# Patient Record
Sex: FEMALE | Race: OTHER | NOT HISPANIC OR LATINO | URBAN - METROPOLITAN AREA
[De-identification: names, ages, dates, MRNs, and addresses within clinical notes are randomized per-mention and may not be internally consistent; named-entity substitution may affect disease eponyms.]

---

## 2019-02-23 ENCOUNTER — EMERGENCY (EMERGENCY)
Facility: HOSPITAL | Age: 24
LOS: 1 days | Discharge: HOME | End: 2019-02-23
Admitting: EMERGENCY MEDICINE
Payer: COMMERCIAL

## 2019-02-23 VITALS
SYSTOLIC BLOOD PRESSURE: 122 MMHG | RESPIRATION RATE: 18 BRPM | OXYGEN SATURATION: 100 % | HEART RATE: 77 BPM | DIASTOLIC BLOOD PRESSURE: 58 MMHG | TEMPERATURE: 98 F

## 2019-02-23 VITALS
DIASTOLIC BLOOD PRESSURE: 80 MMHG | OXYGEN SATURATION: 99 % | RESPIRATION RATE: 18 BRPM | SYSTOLIC BLOOD PRESSURE: 126 MMHG | HEART RATE: 81 BPM | TEMPERATURE: 98 F

## 2019-02-23 LAB
ALBUMIN SERPL ELPH-MCNC: 3.8 G/DL — SIGNIFICANT CHANGE UP (ref 3.4–5)
ALP SERPL-CCNC: 50 U/L — SIGNIFICANT CHANGE UP (ref 40–120)
ALT FLD-CCNC: 23 U/L — SIGNIFICANT CHANGE UP (ref 12–42)
ANION GAP SERPL CALC-SCNC: 9 MMOL/L — SIGNIFICANT CHANGE UP (ref 9–16)
APPEARANCE UR: CLEAR — SIGNIFICANT CHANGE UP
AST SERPL-CCNC: 25 U/L — SIGNIFICANT CHANGE UP (ref 15–37)
BASOPHILS NFR BLD AUTO: 0.5 % — SIGNIFICANT CHANGE UP (ref 0–2)
BILIRUB SERPL-MCNC: 0.4 MG/DL — SIGNIFICANT CHANGE UP (ref 0.2–1.2)
BILIRUB UR-MCNC: NEGATIVE — SIGNIFICANT CHANGE UP
BUN SERPL-MCNC: 19 MG/DL — SIGNIFICANT CHANGE UP (ref 7–23)
CALCIUM SERPL-MCNC: 8.6 MG/DL — SIGNIFICANT CHANGE UP (ref 8.5–10.5)
CHLORIDE SERPL-SCNC: 106 MMOL/L — SIGNIFICANT CHANGE UP (ref 96–108)
CO2 SERPL-SCNC: 25 MMOL/L — SIGNIFICANT CHANGE UP (ref 22–31)
COLOR SPEC: YELLOW — SIGNIFICANT CHANGE UP
CREAT SERPL-MCNC: 0.74 MG/DL — SIGNIFICANT CHANGE UP (ref 0.5–1.3)
DIFF PNL FLD: ABNORMAL
EOSINOPHIL NFR BLD AUTO: 0 % — SIGNIFICANT CHANGE UP (ref 0–6)
GLUCOSE SERPL-MCNC: 88 MG/DL — SIGNIFICANT CHANGE UP (ref 70–99)
GLUCOSE UR QL: NEGATIVE — SIGNIFICANT CHANGE UP
HCG UR QL: NEGATIVE — SIGNIFICANT CHANGE UP
HCT VFR BLD CALC: 36.9 % — SIGNIFICANT CHANGE UP (ref 34.5–45)
HGB BLD-MCNC: 12.3 G/DL — SIGNIFICANT CHANGE UP (ref 11.5–15.5)
IMM GRANULOCYTES NFR BLD AUTO: 0.2 % — SIGNIFICANT CHANGE UP (ref 0–1.5)
KETONES UR-MCNC: 15 MG/DL
LEUKOCYTE ESTERASE UR-ACNC: NEGATIVE — SIGNIFICANT CHANGE UP
LYMPHOCYTES # BLD AUTO: 10.4 % — LOW (ref 13–44)
MAGNESIUM SERPL-MCNC: 1.7 MG/DL — SIGNIFICANT CHANGE UP (ref 1.6–2.6)
MCHC RBC-ENTMCNC: 27.9 PG — SIGNIFICANT CHANGE UP (ref 27–34)
MCHC RBC-ENTMCNC: 33.3 G/DL — SIGNIFICANT CHANGE UP (ref 32–36)
MCV RBC AUTO: 83.7 FL — SIGNIFICANT CHANGE UP (ref 80–100)
MONOCYTES NFR BLD AUTO: 3.7 % — SIGNIFICANT CHANGE UP (ref 2–14)
NEUTROPHILS NFR BLD AUTO: 85.2 % — HIGH (ref 43–77)
NITRITE UR-MCNC: NEGATIVE — SIGNIFICANT CHANGE UP
PH UR: 7.5 — SIGNIFICANT CHANGE UP (ref 5–8)
PLATELET # BLD AUTO: 320 K/UL — SIGNIFICANT CHANGE UP (ref 150–400)
POTASSIUM SERPL-MCNC: 4.3 MMOL/L — SIGNIFICANT CHANGE UP (ref 3.5–5.3)
POTASSIUM SERPL-SCNC: 4.3 MMOL/L — SIGNIFICANT CHANGE UP (ref 3.5–5.3)
PROT SERPL-MCNC: 7.7 G/DL — SIGNIFICANT CHANGE UP (ref 6.4–8.2)
PROT UR-MCNC: ABNORMAL MG/DL
RBC # BLD: 4.41 M/UL — SIGNIFICANT CHANGE UP (ref 3.8–5.2)
RBC # FLD: 15.2 % — HIGH (ref 10.3–14.5)
SODIUM SERPL-SCNC: 140 MMOL/L — SIGNIFICANT CHANGE UP (ref 132–145)
SP GR SPEC: 1.01 — SIGNIFICANT CHANGE UP (ref 1–1.03)
UROBILINOGEN FLD QL: 0.2 E.U./DL — SIGNIFICANT CHANGE UP
WBC # BLD: 9.9 K/UL — SIGNIFICANT CHANGE UP (ref 3.8–10.5)
WBC # FLD AUTO: 9.9 K/UL — SIGNIFICANT CHANGE UP (ref 3.8–10.5)

## 2019-02-23 PROCEDURE — 99284 EMERGENCY DEPT VISIT MOD MDM: CPT

## 2019-02-23 RX ORDER — ONDANSETRON 8 MG/1
4 TABLET, FILM COATED ORAL ONCE
Qty: 0 | Refills: 0 | Status: COMPLETED | OUTPATIENT
Start: 2019-02-23 | End: 2019-02-23

## 2019-02-23 RX ORDER — SODIUM CHLORIDE 9 MG/ML
1000 INJECTION INTRAMUSCULAR; INTRAVENOUS; SUBCUTANEOUS ONCE
Qty: 0 | Refills: 0 | Status: COMPLETED | OUTPATIENT
Start: 2019-02-23 | End: 2019-02-23

## 2019-02-23 RX ADMIN — SODIUM CHLORIDE 2000 MILLILITER(S): 9 INJECTION INTRAMUSCULAR; INTRAVENOUS; SUBCUTANEOUS at 16:55

## 2019-02-23 RX ADMIN — ONDANSETRON 4 MILLIGRAM(S): 8 TABLET, FILM COATED ORAL at 17:31

## 2019-02-23 NOTE — ED ADULT NURSE NOTE - NSIMPLEMENTINTERV_GEN_ALL_ED
Implemented All Universal Safety Interventions:  [place] to call system. Call bell, personal items and telephone within reach. Instruct patient to call for assistance. Room bathroom lighting operational. Non-slip footwear when patient is off stretcher. Physically safe environment: no spills, clutter or unnecessary equipment. Stretcher in lowest position, wheels locked, appropriate side rails in place.

## 2019-02-23 NOTE — ED PROVIDER NOTE - CHPI ED SYMPTOMS NEG
no abdominal distension/no diarrhea/no chills/no blood in stool/no burning urination/no dysuria/no fever/no hematuria

## 2019-02-23 NOTE — ED PROVIDER NOTE - CLINICAL SUMMARY MEDICAL DECISION MAKING FREE TEXT BOX
n/v related to heavy alcohol use; pt not daily drinker and CIWA 0; well appearing.  ddx includes dehydration and n/v related to alcohol consumption

## 2019-02-23 NOTE — ED PROVIDER NOTE - OBJECTIVE STATEMENT
Pt is 22 yo female with no sig pmhx who presents with nausea and vomiting for several hours after heavy alcohol intake last night.  Pt reports also has a headache which she notes is consistent with headaches shes had in the past after drinking.  Pt denies any abdominal pain, back pain, urinary symptoms, fevers, chills, diarrhea.  Pt denies any chest pain or sob.  Pt reports unable to keep down water PTA.

## 2019-02-27 DIAGNOSIS — R11.2 NAUSEA WITH VOMITING, UNSPECIFIED: ICD-10-CM

## 2019-12-15 ENCOUNTER — EMERGENCY (EMERGENCY)
Facility: HOSPITAL | Age: 24
LOS: 1 days | Discharge: HOME | End: 2019-12-15
Attending: EMERGENCY MEDICINE | Admitting: EMERGENCY MEDICINE
Payer: COMMERCIAL

## 2019-12-15 VITALS
WEIGHT: 149.91 LBS | SYSTOLIC BLOOD PRESSURE: 105 MMHG | HEART RATE: 89 BPM | RESPIRATION RATE: 16 BRPM | HEIGHT: 64 IN | DIASTOLIC BLOOD PRESSURE: 63 MMHG | OXYGEN SATURATION: 98 % | TEMPERATURE: 98 F

## 2019-12-15 LAB
ALBUMIN SERPL ELPH-MCNC: 3.5 G/DL — SIGNIFICANT CHANGE UP (ref 3.4–5)
ALP SERPL-CCNC: 41 U/L — SIGNIFICANT CHANGE UP (ref 40–120)
ALT FLD-CCNC: 21 U/L — SIGNIFICANT CHANGE UP (ref 12–42)
ANION GAP SERPL CALC-SCNC: 9 MMOL/L — SIGNIFICANT CHANGE UP (ref 9–16)
AST SERPL-CCNC: 19 U/L — SIGNIFICANT CHANGE UP (ref 15–37)
BILIRUB SERPL-MCNC: 0.3 MG/DL — SIGNIFICANT CHANGE UP (ref 0.2–1.2)
BUN SERPL-MCNC: 20 MG/DL — SIGNIFICANT CHANGE UP (ref 7–23)
CALCIUM SERPL-MCNC: 8.5 MG/DL — SIGNIFICANT CHANGE UP (ref 8.5–10.5)
CHLORIDE SERPL-SCNC: 104 MMOL/L — SIGNIFICANT CHANGE UP (ref 96–108)
CO2 SERPL-SCNC: 27 MMOL/L — SIGNIFICANT CHANGE UP (ref 22–31)
CREAT SERPL-MCNC: 0.72 MG/DL — SIGNIFICANT CHANGE UP (ref 0.5–1.3)
ETHANOL SERPL-MCNC: <3 MG/DL — SIGNIFICANT CHANGE UP
GLUCOSE SERPL-MCNC: 123 MG/DL — HIGH (ref 70–99)
HCT VFR BLD CALC: 33.4 % — LOW (ref 34.5–45)
HGB BLD-MCNC: 11 G/DL — LOW (ref 11.5–15.5)
LIDOCAIN IGE QN: 78 U/L — SIGNIFICANT CHANGE UP (ref 73–393)
MCHC RBC-ENTMCNC: 26.6 PG — LOW (ref 27–34)
MCHC RBC-ENTMCNC: 32.9 GM/DL — SIGNIFICANT CHANGE UP (ref 32–36)
MCV RBC AUTO: 80.7 FL — SIGNIFICANT CHANGE UP (ref 80–100)
NRBC # BLD: 0 /100 WBCS — SIGNIFICANT CHANGE UP (ref 0–0)
PLATELET # BLD AUTO: 318 K/UL — SIGNIFICANT CHANGE UP (ref 150–400)
POTASSIUM SERPL-MCNC: 4.1 MMOL/L — SIGNIFICANT CHANGE UP (ref 3.5–5.3)
POTASSIUM SERPL-SCNC: 4.1 MMOL/L — SIGNIFICANT CHANGE UP (ref 3.5–5.3)
PROT SERPL-MCNC: 7 G/DL — SIGNIFICANT CHANGE UP (ref 6.4–8.2)
RBC # BLD: 4.14 M/UL — SIGNIFICANT CHANGE UP (ref 3.8–5.2)
RBC # FLD: 15.8 % — HIGH (ref 10.3–14.5)
SODIUM SERPL-SCNC: 140 MMOL/L — SIGNIFICANT CHANGE UP (ref 132–145)
WBC # BLD: 10.41 K/UL — SIGNIFICANT CHANGE UP (ref 3.8–10.5)
WBC # FLD AUTO: 10.41 K/UL — SIGNIFICANT CHANGE UP (ref 3.8–10.5)

## 2019-12-15 PROCEDURE — 99284 EMERGENCY DEPT VISIT MOD MDM: CPT

## 2019-12-15 RX ORDER — SODIUM CHLORIDE 9 MG/ML
1000 INJECTION INTRAMUSCULAR; INTRAVENOUS; SUBCUTANEOUS ONCE
Refills: 0 | Status: COMPLETED | OUTPATIENT
Start: 2019-12-15 | End: 2019-12-15

## 2019-12-15 RX ORDER — ONDANSETRON 8 MG/1
4 TABLET, FILM COATED ORAL ONCE
Refills: 0 | Status: COMPLETED | OUTPATIENT
Start: 2019-12-15 | End: 2019-12-15

## 2019-12-15 RX ADMIN — SODIUM CHLORIDE 1000 MILLILITER(S): 9 INJECTION INTRAMUSCULAR; INTRAVENOUS; SUBCUTANEOUS at 18:37

## 2019-12-15 RX ADMIN — ONDANSETRON 4 MILLIGRAM(S): 8 TABLET, FILM COATED ORAL at 18:37

## 2019-12-15 NOTE — ED ADULT TRIAGE NOTE - CHIEF COMPLAINT QUOTE
"I drank myself into this. I went out last night and drank a lot of alcohol and I need a bag of fluids. I cant hold anything down." admits to cocaine use as well. +nausea lmp 11/24/19

## 2019-12-15 NOTE — ED PROVIDER NOTE - OBJECTIVE STATEMENT
23 y/o F with no PMHx presents to the ED for nausea. Pt reports she went out drinking last night. Today, she woke up with nausea that has not resolved. Pt denies Abd pain and vomiting.

## 2019-12-15 NOTE — ED PROVIDER NOTE - NSFOLLOWUPINSTRUCTIONS_ED_ALL_ED_FT
Please see your doctor this week or call Patient Access Services to make a primary care appointment for you this week.  Please take Pepcid 20mg every 12 hours as needed for three days for upset stomach.  Please avoid alcohol or caffeine.

## 2019-12-15 NOTE — ED ADULT NURSE NOTE - NSIMPLEMENTINTERV_GEN_ALL_ED
Implemented All Universal Safety Interventions:  [place] to call system. Call bell, personal items and telephone within reach. Instruct patient to call for assistance. Room bathroom lighting operational. Non-slip footwear when patient is off stretcher. Physically safe environment: no spills, clutter or unnecessary equipment. Stretcher in lowest position, wheels locked, appropriate side rails in place.

## 2019-12-15 NOTE — ED PROVIDER NOTE - CLINICAL SUMMARY MEDICAL DECISION MAKING FREE TEXT BOX
23 y/o F presenting with nausea x1 day in the context of alcohol use. Will order basic labs and antiemetics. Exam is otherwise unremarkable. Will reassess afterwards.

## 2019-12-15 NOTE — ED PROVIDER NOTE - PATIENT PORTAL LINK FT
You can access the FollowMyHealth Patient Portal offered by [facility] by registering at the following website: [email or web address]. By joining [employer]’s FollowMyHealth portal, you will also be able to view your health information using other applications (apps) compatible with our system.

## 2019-12-21 DIAGNOSIS — R11.0 NAUSEA: ICD-10-CM

## 2020-04-01 NOTE — ED PROVIDER NOTE - CROS ED ROS STATEMENT
Patient is aware for the 24 to 48 hour turn around time.    Pharmacy setup and verified.  
Reviewed chart and refilled medication within protocol.    
all other ROS negative except as per HPI

## 2023-02-18 NOTE — ED PROVIDER NOTE - NS ED NOTE AC HIGH RISK COUNTRIES
Nephrology Progress Note    Reports she had mild headache overnight which resolved with Tylenol    ROS:  Resp:negative  CV:negative  GI:negative  Gu:negative    MEDICATIONS:  Current Facility-Administered Medications   Medication Dose Route Frequency Provider Last Rate Last Admin   • meclizine (ANTIVERT) tablet 25 mg  25 mg Oral TID Tracy Galera, CNP   25 mg at 02/18/23 0840   • nystatin (MYCOSTATIN) powder   Topical 2 times per day Irina Raman MD   Given at 02/18/23 0841   • sodium chloride (PF) 0.9 % injection 2 mL  2 mL Intracatheter 2 times per day Tracy Galera, CNP   2 mL at 02/18/23 0841   • sodium chloride (NORMAL SALINE) 0.9 % bolus 1,000 mL  1,000 mL Intravenous Once Haja Thompson MD       • carvedilol (COREG) tablet 25 mg  25 mg Oral 2 times per day Malcolm Phelps MD   25 mg at 02/18/23 0840   • DULoxetine (CYMBALTA) capsule 60 mg  60 mg Oral Daily Tracy Galera, CNP   60 mg at 02/17/23 2022   • levothyroxine (SYNTHROID, LEVOTHROID) tablet 100 mcg  100 mcg Oral QAM AC Tracy Galera, CNP   100 mcg at 02/18/23 0619   • tamoxifen (NOLVADEX) tablet 5 mg  5 mg Oral Daily Tracy Galera, CNP   5 mg at 02/18/23 0840   • thiamine (VITAMIN B1) tablet 100 mg  100 mg Oral Daily Tracy Galera, CNP   100 mg at 02/18/23 0840          Physical Examination:  Vital Signs:    Visit Vitals  BP (!) 148/91   Pulse 70   Temp 98.2 °F (36.8 °C) (Oral)   Resp 16   Ht 5' 6\" (1.676 m)   Wt 103.7 kg (228 lb 9.9 oz)   LMP 11/08/2019   SpO2 98%   BMI 36.90 kg/m²     General:  No acute distress.  Conversant.  Head:  Normocephalic-atraumatic.   Neck:  Trachea is midline. No JVD.  ENT:   Mucous membranes are moist.    Cardiovascular:  S1, S2 auscultated.Bilateral peripheral pulses are intact. No edema.  Respiratory:   Bilateral breath sounds heard.  Lungs clear to auscultation.  Symmetric chest wall expansion.  Respirations are non-labored.    Gastrointestinal:  Soft and nontender.  Non-distended.  Positive bowel sounds.     Genitourinary: No CVA tenderness.    Musculoskeletal:  No joint swelling.    Skin: Warm and dry.  No rash noted.  Neurologic:   CN II-XII grossly intact.  Psychiatric:   Alert and oriented X 3.  Calm.  Cooperative.       I/O's    Intake/Output Summary (Last 24 hours) at 2/18/2023 0957  Last data filed at 2/17/2023 2000  Gross per 24 hour   Intake --   Output 200 ml   Net -200 ml       Labs:    Recent Labs   Lab 02/18/23  0550 02/17/23  0602 02/16/23  0535   SODIUM 136 135 131*   POTASSIUM 3.7 3.4 3.5   CHLORIDE 105 103 101   CO2 20* 21 19*   BUN 50* 47* 45*   CREATININE 2.20* 2.45* 2.64*   GLUCOSE 126* 105* 133*   CALCIUM 8.4 8.2* 7.9*      Recent Labs   Lab 02/18/23  0550 02/17/23  0602 02/16/23  0535 02/15/23  0750 02/14/23  0401 02/14/23  0004   SODIUM 136 135 131* 136  --  136   CHLORIDE 105 103 101 101  --  100   CO2 20* 21 19* 26  --  21   BUN 50* 47* 45* 45*  --  28*   CREATININE 2.20* 2.45* 2.64* 2.71*  --  1.52*   CALCIUM 8.4 8.2* 7.9* 8.7  --  9.2   ALBUMIN  --   --  2.8* 3.1*  --  3.5*   BILIRUBIN  --   --  0.4 0.5  --  1.1*   ALKPT  --   --  88 98  --  136*   GPT  --   --  28 29  --  38   AST  --   --  32 39*  --  55*   GLUCOSE 126* 105* 133* 114*   < > 219*    < > = values in this interval not displayed.      Recent Labs   Lab 02/17/23  0602   WBC 3.6*   RBC 2.84*   HGB 9.3*   HCT 27.6*   PLT 93*        Imaging    MRI BRAIN WO CONTRAST   Final Result   1. No acute intracranial abnormalities.      Electronically Signed by: JASON FRASER M.D.    Signed on: 2/14/2023 8:11 PM          XR CHEST AP OR PA   Final Result   1. Cardiomegaly without evidence of acute infiltrate on this AP portable   upright chest radiograph unchanged dating back to 12/19/2022.      Electronically Signed by: JASON FRASER M.D.    Signed on: 2/14/2023 7:09 AM          CT HEAD WO CONTRAST   Final Result      No acute intracranial findings.      Electronically Signed by: YANET ALBARRAN M.D.    Signed on: 2/14/2023 5:55 AM                Assessment and Plan:    1.  LACI.  Superimposed on stage III CKD.  Serum creatinine continues to improve.  Continue supportive care.  Encourage oral hydration.    2.  Hypertension.  Continue carvedilol 12.5 mg twice daily.    Okay to DC from a renal standpoint.  Follow-up with Dr. Villela as previously scheduled.   No